# Patient Record
Sex: MALE | Race: WHITE | NOT HISPANIC OR LATINO | Employment: OTHER | URBAN - METROPOLITAN AREA
[De-identification: names, ages, dates, MRNs, and addresses within clinical notes are randomized per-mention and may not be internally consistent; named-entity substitution may affect disease eponyms.]

---

## 2017-01-17 ENCOUNTER — DOCUMENTATION ONLY (OUTPATIENT)
Dept: FAMILY MEDICINE | Facility: CLINIC | Age: 47
End: 2017-01-17

## 2017-01-17 NOTE — PROGRESS NOTES
Pre-Visit Chart Review  For Appointment Scheduled on 1/18/2017.    Health Maintenance Due   Topic Date Due    TETANUS VACCINE  03/27/1990    Influenza Vaccine  08/01/2016

## 2017-01-18 ENCOUNTER — LAB VISIT (OUTPATIENT)
Dept: LAB | Facility: HOSPITAL | Age: 47
End: 2017-01-18
Attending: FAMILY MEDICINE
Payer: MEDICARE

## 2017-01-18 ENCOUNTER — TELEPHONE (OUTPATIENT)
Dept: FAMILY MEDICINE | Facility: CLINIC | Age: 47
End: 2017-01-18

## 2017-01-18 ENCOUNTER — OFFICE VISIT (OUTPATIENT)
Dept: FAMILY MEDICINE | Facility: CLINIC | Age: 47
End: 2017-01-18
Payer: MEDICARE

## 2017-01-18 VITALS
HEART RATE: 93 BPM | HEIGHT: 68 IN | WEIGHT: 139.13 LBS | SYSTOLIC BLOOD PRESSURE: 131 MMHG | RESPIRATION RATE: 18 BRPM | BODY MASS INDEX: 21.09 KG/M2 | DIASTOLIC BLOOD PRESSURE: 77 MMHG | TEMPERATURE: 98 F

## 2017-01-18 DIAGNOSIS — R91.8 PULMONARY NODULES: ICD-10-CM

## 2017-01-18 DIAGNOSIS — Z00.00 PREVENTATIVE HEALTH CARE: ICD-10-CM

## 2017-01-18 DIAGNOSIS — K21.9 GASTROESOPHAGEAL REFLUX DISEASE WITHOUT ESOPHAGITIS: ICD-10-CM

## 2017-01-18 DIAGNOSIS — N52.9 ERECTILE DYSFUNCTION, UNSPECIFIED ERECTILE DYSFUNCTION TYPE: ICD-10-CM

## 2017-01-18 DIAGNOSIS — Z11.4 ENCOUNTER FOR SCREENING FOR HIV: ICD-10-CM

## 2017-01-18 DIAGNOSIS — R79.9 ABNORMAL FINDING OF BLOOD CHEMISTRY: ICD-10-CM

## 2017-01-18 DIAGNOSIS — Z86.73 HISTORY OF CVA (CEREBROVASCULAR ACCIDENT): Primary | ICD-10-CM

## 2017-01-18 DIAGNOSIS — G47.00 INSOMNIA, UNSPECIFIED TYPE: ICD-10-CM

## 2017-01-18 DIAGNOSIS — J44.9 CHRONIC OBSTRUCTIVE PULMONARY DISEASE, UNSPECIFIED COPD TYPE: ICD-10-CM

## 2017-01-18 DIAGNOSIS — E55.9 VITAMIN D DEFICIENCY: ICD-10-CM

## 2017-01-18 DIAGNOSIS — I73.9 PVD (PERIPHERAL VASCULAR DISEASE): ICD-10-CM

## 2017-01-18 LAB — 25(OH)D3+25(OH)D2 SERPL-MCNC: 20 NG/ML

## 2017-01-18 PROCEDURE — G0008 ADMIN INFLUENZA VIRUS VAC: HCPCS | Mod: S$GLB,,, | Performed by: INTERNAL MEDICINE

## 2017-01-18 PROCEDURE — 99499 UNLISTED E&M SERVICE: CPT | Mod: S$GLB,,, | Performed by: FAMILY MEDICINE

## 2017-01-18 PROCEDURE — 83036 HEMOGLOBIN GLYCOSYLATED A1C: CPT

## 2017-01-18 PROCEDURE — 99999 PR PBB SHADOW E&M-EST. PATIENT-LVL III: CPT | Mod: PBBFAC,,, | Performed by: FAMILY MEDICINE

## 2017-01-18 PROCEDURE — 1159F MED LIST DOCD IN RCRD: CPT | Mod: S$GLB,,, | Performed by: FAMILY MEDICINE

## 2017-01-18 PROCEDURE — 99214 OFFICE O/P EST MOD 30 MIN: CPT | Mod: S$GLB,,, | Performed by: FAMILY MEDICINE

## 2017-01-18 PROCEDURE — 86592 SYPHILIS TEST NON-TREP QUAL: CPT

## 2017-01-18 PROCEDURE — 82306 VITAMIN D 25 HYDROXY: CPT

## 2017-01-18 PROCEDURE — 86703 HIV-1/HIV-2 1 RESULT ANTBDY: CPT

## 2017-01-18 PROCEDURE — 36415 COLL VENOUS BLD VENIPUNCTURE: CPT | Mod: PO

## 2017-01-18 PROCEDURE — 90686 IIV4 VACC NO PRSV 0.5 ML IM: CPT | Mod: S$GLB,,, | Performed by: INTERNAL MEDICINE

## 2017-01-18 RX ORDER — TRAZODONE HYDROCHLORIDE 50 MG/1
50 TABLET ORAL NIGHTLY
Qty: 30 TABLET | Refills: 1 | Status: SHIPPED | OUTPATIENT
Start: 2017-01-18 | End: 2017-08-23

## 2017-01-18 RX ORDER — SILDENAFIL 100 MG/1
100 TABLET, FILM COATED ORAL DAILY PRN
Qty: 5 TABLET | Refills: 0 | Status: SHIPPED | OUTPATIENT
Start: 2017-01-18 | End: 2017-02-22 | Stop reason: SDUPTHER

## 2017-01-18 NOTE — TELEPHONE ENCOUNTER
Insurance won't cover Viagra too expensive. Told him Cialis will be a pricey. Suggestion to go to websites to see if coupons available. Voices understanding
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
## 2017-01-18 NOTE — TELEPHONE ENCOUNTER
----- Message from Brea Espinosa sent at 1/18/2017  3:35 PM CST -----  Contact: self  Patient would like a call regarding something personal at [phone]. Thanks!

## 2017-01-18 NOTE — MR AVS SNAPSHOT
[facility]  [address]  [place] [address]  Phone: [phone]  Fax: [phone]                  Geo Pendleton Jr.   2017 1:00 PM   Office Visit    Description:  Male : 1970   Provider:  Blanco Chaney MD   Department:  [facility]           Reason for Visit     Establish Care           Diagnoses this Visit        Comments    History of CVA (cerebrovascular accident)    -  Primary     Gastroesophageal reflux disease without esophagitis         Chronic obstructive pulmonary disease, unspecified COPD type         Pulmonary nodules         Preventative health care         Encounter for screening for HIV         Erectile dysfunction, unspecified erectile dysfunction type         Abnormal finding of blood chemistry         Vitamin D deficiency         Insomnia, unspecified type         PVD (peripheral vascular disease)                To Do List           Future Appointments        Provider Department Dept Phone    2017 2:15 PM [facility] [phone]    2017 12:45 PM [facility] CT1 LIMIT [address] [facility] [phone]    2017 1:30 PM Aida Fitch MD [facility] [phone]    2017 9:40 AM Blanco Chaney MD [facility] [phone]      Goals (5 Years of Data)     None      Follow-Up and Disposition     Return in about 4 weeks (around 2/15/2017).       These Medications        Disp Refills Start End    sildenafil (VIAGRA) 100 MG tablet 5 tablet 0 2017    Take 1 tablet (100 mg total) by mouth daily as needed for Erectile Dysfunction. - Oral    Pharmacy: [id number]  [place]  [address] AT [facility] & [place] [address] Ph #: [phone]       trazodone (DESYREL) 50 MG tablet 30 tablet 1 2017    Take 1 tablet (50 mg total) by mouth every evening. - Oral    Pharmacy: [facility] [id number]  [place] - [address] AT [facility] & [place] [address] Ph #: [phone]         [facility]     [facility] On Call Nurse Care Line - 24/7 Assistance  Registered nurses in the [facility] On Call Center provide clinical advisement, health education, appointment booking, and other advisory services.  Call for this free service at [phone].             Medications           Message regarding Medications     Verify the changes and/or additions to your medication regime listed below are the same as discussed with your clinician today.  If any of these changes or additions are incorrect, please notify your healthcare provider.        START taking these NEW medications        Refills    sildenafil (VIAGRA) 100 MG tablet 0    Sig: Take 1 tablet (100 mg total) by mouth daily as needed for Erectile Dysfunction.    Class: Normal    Route: Oral    trazodone (DESYREL) 50 MG tablet 1    Sig: Take 1 tablet (50 mg total) by mouth every evening.    Class: Normal    Route: Oral      STOP taking these medications     zolpidem (AMBIEN) 10 mg Tab            Verify that the below list of medications is an accurate representation of the medications you are currently taking.  If none reported, the list may be blank. If incorrect, please contact your healthcare provider. Carry this list with you in case of emergency.           Current Medications     atorvastatin (LIPITOR) 10 MG tablet Take 1 tablet (10 mg total) by mouth once daily.    pantoprazole (PROTONIX) 40 MG tablet Take 1 tablet (40 mg total) by mouth once daily.    sildenafil (VIAGRA) 100 MG tablet Take 1 tablet (100 mg total) by mouth daily as needed for Erectile Dysfunction.    trazodone (DESYREL) 50 MG tablet Take 1 tablet (50 mg total) by mouth every evening.           Clinical Reference Information           Vital Signs - Last Recorded  Most recent update: 1/18/2017  1:10 PM by Vlad Hewitt MA    BP Pulse Temp Resp Ht Wt    131/77 (BP Location: Right arm, Patient Position:  "Sitting, BP Method: Automatic) 93 98 °F (36.7 °C) (Oral) 18 5' 7.5" (1.715 m) 63.1 kg (139 lb 1.8 oz)    BMI                21.47 kg/m2          Blood Pressure          Most Recent Value    BP  131/77      Allergies as of 1/18/2017     No Known Allergies      Immunizations Administered on Date of Encounter - 1/18/2017     None      Orders Placed During Today's Visit     Future Labs/Procedures Expected by Expires    CT Chest Without Contrast  1/18/2017 1/18/2018    Hemoglobin A1c  1/18/2017 3/19/2018    HIV-1 and HIV-2 antibodies  1/18/2017 3/19/2018    RPR  1/18/2017 3/19/2018    Vitamin D  1/18/2017 3/19/2018      Smoking Cessation     If you would like to quit smoking:   You may be eligible for free services if you are a [place] resident and started smoking cigarettes before September 1, 1988.  Call the Smoking Cessation Trust (SCT) toll free at (123) 744-4799 or (735) 388-2601.   Call 6-450-QUIT-NOW if you do not meet the above criteria.            "

## 2017-01-19 ENCOUNTER — OFFICE VISIT (OUTPATIENT)
Dept: VASCULAR SURGERY | Facility: CLINIC | Age: 47
End: 2017-01-19
Payer: MEDICARE

## 2017-01-19 ENCOUNTER — HOSPITAL ENCOUNTER (OUTPATIENT)
Dept: RADIOLOGY | Facility: HOSPITAL | Age: 47
Discharge: HOME | End: 2017-01-19
Attending: FAMILY MEDICINE
Payer: MEDICARE

## 2017-01-19 VITALS
WEIGHT: 140 LBS | SYSTOLIC BLOOD PRESSURE: 132 MMHG | DIASTOLIC BLOOD PRESSURE: 81 MMHG | HEIGHT: 67 IN | HEART RATE: 84 BPM | RESPIRATION RATE: 18 BRPM | BODY MASS INDEX: 21.97 KG/M2

## 2017-01-19 DIAGNOSIS — I87.2 VENOUS INSUFFICIENCY OF LEFT LOWER EXTREMITY: Primary | ICD-10-CM

## 2017-01-19 DIAGNOSIS — E55.9 VITAMIN D DEFICIENCY: Primary | ICD-10-CM

## 2017-01-19 DIAGNOSIS — R91.8 PULMONARY NODULES: ICD-10-CM

## 2017-01-19 LAB
ESTIMATED AVG GLUCOSE: 108 MG/DL
HBA1C MFR BLD HPLC: 5.4 %
HIV 1+2 AB+HIV1 P24 AG SERPL QL IA: NEGATIVE
RPR SER QL: NORMAL

## 2017-01-19 PROCEDURE — 99499 UNLISTED E&M SERVICE: CPT | Mod: S$GLB,,, | Performed by: THORACIC SURGERY (CARDIOTHORACIC VASCULAR SURGERY)

## 2017-01-19 PROCEDURE — 99999 PR PBB SHADOW E&M-EST. PATIENT-LVL III: CPT | Mod: PBBFAC,,, | Performed by: THORACIC SURGERY (CARDIOTHORACIC VASCULAR SURGERY)

## 2017-01-19 PROCEDURE — 71250 CT THORAX DX C-: CPT | Mod: 26,,, | Performed by: RADIOLOGY

## 2017-01-19 PROCEDURE — 99205 OFFICE O/P NEW HI 60 MIN: CPT | Mod: S$GLB,,, | Performed by: THORACIC SURGERY (CARDIOTHORACIC VASCULAR SURGERY)

## 2017-01-19 PROCEDURE — 1159F MED LIST DOCD IN RCRD: CPT | Mod: S$GLB,,, | Performed by: THORACIC SURGERY (CARDIOTHORACIC VASCULAR SURGERY)

## 2017-01-19 RX ORDER — ERGOCALCIFEROL 1.25 MG/1
50000 CAPSULE ORAL
Qty: 8 CAPSULE | Refills: 0 | Status: SHIPPED | OUTPATIENT
Start: 2017-01-19 | End: 2017-02-03

## 2017-01-19 NOTE — MR AVS SNAPSHOT
[facility]  [address]  [place] [address]  Phone: [phone]                  Geo Pendleton Jr.   2017 1:30 PM   Office Visit    Description:  Male : 1970   Provider:  Aida Fitch MD   Department:  [facility]           Reason for Visit     Varicose Veins           Diagnoses this Visit        Comments    Venous insufficiency of left lower extremity    -  Primary            To Do List           Future Appointments        Provider Department Dept Phone    2017 9:40 AM Blanco Chaney MD [facility] [phone]      Goals (5 Years of Data)     None      [facility]     [facility] On Call Nurse Care Line -  Assistance  Registered nurses in the [facility] On Call Center provide clinical advisement, health education, appointment booking, and other advisory services.  Call for this free service at [phone].             Medications           Message regarding Medications     Verify the changes and/or additions to your medication regime listed below are the same as discussed with your clinician today.  If any of these changes or additions are incorrect, please notify your healthcare provider.             Verify that the below list of medications is an accurate representation of the medications you are currently taking.  If none reported, the list may be blank. If incorrect, please contact your healthcare provider. Carry this list with you in case of emergency.           Current Medications     atorvastatin (LIPITOR) 10 MG tablet Take 1 tablet (10 mg total) by mouth once daily.    ergocalciferol (ERGOCALCIFEROL) 50,000 unit Cap Take 1 capsule (50,000 Units total) by mouth every 7 days.    pantoprazole (PROTONIX) 40 MG tablet Take 1 tablet (40 mg total) by mouth once daily.    sildenafil (VIAGRA) 100 MG tablet Take 1 tablet (100 mg total) by mouth daily as needed for Erectile Dysfunction.    trazodone (DESYREL) 50 MG tablet Take 1  "tablet (50 mg total) by mouth every evening.           Clinical Reference Information           Vital Signs - Last Recorded  Most recent update: 1/19/2017  1:30 PM by Zoey Aguila    BP Pulse Resp Ht Wt BMI    132/81 84 18 5' 7" (1.702 m) 63.5 kg (140 lb) 21.93 kg/m2      Blood Pressure          Most Recent Value    BP  132/81      Allergies as of 1/19/2017     No Known Allergies      Immunizations Administered on Date of Encounter - 1/19/2017     None      Smoking Cessation     If you would like to quit smoking:   You may be eligible for free services if you are a [place] resident and started smoking cigarettes before September 1, 1988.  Call the Smoking Cessation Trust (SCT) toll free at (736) 073-2380 or (914) 835-7610.   Call 7-595-QUIT-NOW if you do not meet the above criteria.            "

## 2017-01-19 NOTE — LETTER
January 19, 2017      Terrie Lira, [facility]  [address] [facility] [place] [address]           [facility]  [address] [facility] [address] [place] [address]  Phone: [phone]          Patient: Geo Pendleton Jr.   MR Number: [id number]   YOB: 1970   Date of Visit: 1/19/2017       Dear Terrie Lira:    Thank you for referring Geo Pendleton to me for evaluation. Attached you will find relevant portions of my assessment and plan of care.    If you have questions, please do not hesitate to call me. I look forward to following Geo Pendleton along with you.    Sincerely,    Aiad Fitch MD    Enclosure  CC:  No Recipients    If you would like to receive this communication electronically, please contact [email or web address] or [phone] to request more information on [employer] Link access.    For providers and/or their staff who would like to refer a patient to [facility], please contact us through our one-stop-shop provider referral line, Marii Diaz, at [phone].    If you feel you have received this communication in error or would no longer like to receive these types of communications, please e-mail [email or web address]
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
## 2017-01-23 ENCOUNTER — TELEPHONE (OUTPATIENT)
Dept: VASCULAR SURGERY | Facility: CLINIC | Age: 47
End: 2017-01-23

## 2017-01-23 DIAGNOSIS — I87.302 VENOUS HYPERTENSION OF LEFT LOWER EXTREMITY: ICD-10-CM

## 2017-01-23 DIAGNOSIS — I87.2 VENOUS INSUFFICIENCY: Primary | ICD-10-CM

## 2017-01-23 RX ORDER — SODIUM CHLORIDE 9 MG/ML
INJECTION, SOLUTION INTRAVENOUS CONTINUOUS
Status: CANCELLED | OUTPATIENT
Start: 2017-01-23

## 2017-01-23 NOTE — TELEPHONE ENCOUNTER
----- Message from Za Lovell sent at 1/23/2017  3:21 PM CST -----  Contact: patient  Patient calling in regards to finding out if he is spending the night after procedure or is he leaving that same day. Please advise. Call to pod. No answer.  Call back .  Thanks!

## 2017-02-06 PROBLEM — I87.2 VENOUS INSUFFICIENCY: Status: ACTIVE | Noted: 2017-02-06

## 2017-02-08 ENCOUNTER — TELEPHONE (OUTPATIENT)
Dept: VASCULAR SURGERY | Facility: CLINIC | Age: 47
End: 2017-02-08

## 2017-02-08 NOTE — TELEPHONE ENCOUNTER
----- Message from Betsy Kenyon sent at 2/8/2017 11:27 AM CST -----  Contact: self   Patient wants to speak with a nurse regarding scheduling follow up appointment please call back at [phone] (home)

## 2017-02-09 ENCOUNTER — TELEPHONE (OUTPATIENT)
Dept: VASCULAR SURGERY | Facility: CLINIC | Age: 47
End: 2017-02-09

## 2017-02-09 NOTE — TELEPHONE ENCOUNTER
Pt informed of the procedure and that the stent was placed in his Left Iliac Vein. Verbalized understanding.

## 2017-02-09 NOTE — TELEPHONE ENCOUNTER
----- Message from Kacey Chandler sent at 2/9/2017 11:19 AM CST -----  Contact: pt [phone]  Patient called and asked for a call back he has a questions about his procedure from Monday.

## 2017-02-10 ENCOUNTER — TELEPHONE (OUTPATIENT)
Dept: VASCULAR SURGERY | Facility: CLINIC | Age: 47
End: 2017-02-10

## 2017-02-10 NOTE — TELEPHONE ENCOUNTER
----- Message from Juan Fried sent at 2/10/2017 10:34 AM CST -----  Contact: pt  Pt is requesting a callback  Call Back#[phone]  Thanks

## 2017-02-21 ENCOUNTER — DOCUMENTATION ONLY (OUTPATIENT)
Dept: FAMILY MEDICINE | Facility: CLINIC | Age: 47
End: 2017-02-21

## 2017-02-21 NOTE — PROGRESS NOTES
Pre-Visit Chart Review  For Appointment Scheduled on 2/22/2017.    Health Maintenance Due   Topic Date Due    TETANUS VACCINE  03/27/1990    Pneumococcal PPSV23 (Medium Risk) (1) 03/27/1990

## 2017-02-22 ENCOUNTER — OFFICE VISIT (OUTPATIENT)
Dept: FAMILY MEDICINE | Facility: CLINIC | Age: 47
End: 2017-02-22
Payer: MEDICARE

## 2017-02-22 VITALS
WEIGHT: 140.88 LBS | BODY MASS INDEX: 22.11 KG/M2 | DIASTOLIC BLOOD PRESSURE: 78 MMHG | TEMPERATURE: 98 F | HEART RATE: 90 BPM | HEIGHT: 67 IN | SYSTOLIC BLOOD PRESSURE: 118 MMHG

## 2017-02-22 DIAGNOSIS — E55.9 VITAMIN D DEFICIENCY: ICD-10-CM

## 2017-02-22 DIAGNOSIS — N52.9 ERECTILE DYSFUNCTION, UNSPECIFIED ERECTILE DYSFUNCTION TYPE: ICD-10-CM

## 2017-02-22 PROCEDURE — 1160F RVW MEDS BY RX/DR IN RCRD: CPT | Mod: S$GLB,,, | Performed by: FAMILY MEDICINE

## 2017-02-22 PROCEDURE — 99213 OFFICE O/P EST LOW 20 MIN: CPT | Mod: S$GLB,,, | Performed by: FAMILY MEDICINE

## 2017-02-22 PROCEDURE — 99999 PR PBB SHADOW E&M-EST. PATIENT-LVL III: CPT | Mod: PBBFAC,,, | Performed by: FAMILY MEDICINE

## 2017-02-22 RX ORDER — SILDENAFIL 100 MG/1
100 TABLET, FILM COATED ORAL DAILY PRN
Qty: 5 TABLET | Refills: 0 | Status: SHIPPED | OUTPATIENT
Start: 2017-02-22 | End: 2018-02-22

## 2017-02-22 NOTE — MR AVS SNAPSHOT
[facility]  [address] [place] [address]  Phone: [phone]  Fax: [phone]                  Geo Pendleton Jr.   2017 9:40 AM   Office Visit    Description:  Male : 1970   Provider:  Blanco Chaney MD   Department:  [place] [facility]           Reason for Visit     Follow-up           Diagnoses this Visit        Comments    Vitamin D deficiency         Erectile dysfunction, unspecified erectile dysfunction type                To Do List           Future Appointments        Provider Department Dept Phone    2017 2:30 PM Aida Fitch MD [facility] [phone]    2017 1:00 PM Blanco Chaney MD [facility] [phone]      Goals (5 Years of Data)     None      Follow-Up and Disposition     Return in about 6 months (around 2017).       These Medications        Disp Refills Start End    sildenafil (VIAGRA) 100 MG tablet 5 tablet 0 2017    Take 1 tablet (100 mg total) by mouth daily as needed for Erectile Dysfunction. - Oral    Pharmacy: [id number]  [place], [place] - [address]  AT [place] [address] Ph #: [phone]         [facility] On Call     [facility] On Call Nurse Care Line - 24/7 Assistance  Registered nurses in the [facility] On Call Center provide clinical advisement, health education, appointment booking, and other advisory services.  Call for this free service at [phone].             Medications           Message regarding Medications     Verify the changes and/or additions to your medication regime listed below are the same as discussed with your clinician today.  If any of these changes or additions are incorrect, please notify your healthcare provider.             Verify that the below list of medications is an accurate representation of the medications you are currently taking.  If none reported, the list may be blank. If incorrect, please contact your  "healthcare provider. Carry this list with you in case of emergency.           Current Medications     atorvastatin (LIPITOR) 10 MG tablet Take 1 tablet (10 mg total) by mouth once daily.    clopidogrel (PLAVIX) 75 mg tablet Take 1 tablet (75 mg total) by mouth once daily.    pantoprazole (PROTONIX) 40 MG tablet Take 1 tablet (40 mg total) by mouth once daily.    sildenafil (VIAGRA) 100 MG tablet Take 1 tablet (100 mg total) by mouth daily as needed for Erectile Dysfunction.    trazodone (DESYREL) 50 MG tablet Take 1 tablet (50 mg total) by mouth every evening.    aspirin (ECOTRIN) 81 MG EC tablet Take 1 tablet (81 mg total) by mouth once.    hydrocodone-acetaminophen 5-325mg (NORCO) 5-325 mg per tablet Take 1 tablet by mouth every 6 (six) hours as needed for Pain.           Clinical Reference Information           Your Vitals Were     BP Pulse Temp Height Weight BMI    118/78 (BP Location: Right arm, Patient Position: Sitting, BP Method: Automatic) 90 98.1 °F (36.7 °C) (Oral) 5' 7" (1.702 m) 63.9 kg (140 lb 14 oz) 22.06 kg/m2      Blood Pressure          Most Recent Value    BP  118/78      Allergies as of 2/22/2017     No Known Allergies      Immunizations Administered on Date of Encounter - 2/22/2017     None      Smoking Cessation     If you would like to quit smoking:   You may be eligible for free services if you are a [place] resident and started smoking cigarettes before September 1, 1988.  Call the Smoking Cessation Trust ([facility]) toll free at (065) 158-5635 or (392) 337-9325.   Call 1-800-QUIT-NOW if you do not meet the above criteria.            Language Assistance Services     ATTENTION: Language assistance services are available, free of charge. Please call [phone].      ATENCIÓN: Si habla español, tiene a goncalves disposición servicios gratuitos de asistencia lingüística. Llame al [phone].     CHÚ Ý: N?u b?n nói Ti?ng Vi?t, có các d?ch v? h? tr? ngôn ng? mi?n phí dành cho b?n. G?i s? " [phone].         [facility] complies with applicable Federal civil rights laws and does not discriminate on the basis of race, color, national origin, age, disability, or sex.

## 2017-02-22 NOTE — PROGRESS NOTES
"[facility]  Progress Note    Subjective:       Patient ID: Geo Pendleton Jr. is a 46 y.o. male.    Chief Complaint: ED follow up   HPI46 y.o.male is here today for erectile dysfunction follow-up.  Patient was unable to fill Viagra secondary to cost.  Patient was also found to have vitamin D deficiency on lab work.  Patient is currently not on vitamin D supplementation.  No further complaints at today's visit.  Review of Systems   Constitutional: Negative for chills and fever.   HENT: Negative for congestion.    Eyes: Negative for pain.   Respiratory: Negative for shortness of breath and wheezing.    Cardiovascular: Negative for chest pain, palpitations and leg swelling.   Gastrointestinal: Negative for abdominal pain, nausea and vomiting.   Genitourinary: Negative for difficulty urinating.        ED   Musculoskeletal: Negative for arthralgias, gait problem and myalgias.   Skin: Negative for rash.   Neurological: Negative for seizures.       Objective:      Vitals:    02/22/17 0921   BP: 118/78   BP Location: Right arm   Patient Position: Sitting   BP Method: Automatic   Pulse: 90   Temp: 98.1 °F (36.7 °C)   TempSrc: Oral   Weight: 63.9 kg (140 lb 14 oz)   Height: 5' 7" (1.702 m)     Body mass index is 22.06 kg/(m^2).  Physical Exam   Constitutional: He is oriented to person, place, and time. He appears well-developed and well-nourished. No distress.   HENT:   Head: Normocephalic and atraumatic.   Cardiovascular: Normal rate, regular rhythm, normal heart sounds and intact distal pulses.  Exam reveals no gallop and no friction rub.    No murmur heard.  Pulmonary/Chest: Effort normal and breath sounds normal. No respiratory distress. He has no rales.   Abdominal: Soft. He exhibits no distension and no mass. There is no rebound and no guarding. No hernia.   Musculoskeletal: Normal range of motion.   Neurological: He is alert and oriented to person, place, and time.   Skin: Skin is warm.   Psychiatric: He has a " normal mood and affect. His behavior is normal. Judgment and thought content normal.   Vitals reviewed.      Assessment:       1. Vitamin D deficiency    2. Erectile dysfunction, unspecified erectile dysfunction type        Plan:       Vitamin D deficiency        - Daily Vit D OTC 5797-6650 units daily     Erectile dysfunction, unspecified erectile dysfunction type  -     sildenafil (VIAGRA) 100 MG tablet; Take 1 tablet (100 mg total) by mouth daily as needed for Erectile Dysfunction.  Dispense: 5 tablet; Refill: 0      Return in about 6 months (around 8/22/2017).  Blanco Chaney MD  [facility]  2/22/2017 11:09 AM

## 2017-02-23 ENCOUNTER — OFFICE VISIT (OUTPATIENT)
Dept: VASCULAR SURGERY | Facility: CLINIC | Age: 47
End: 2017-02-23
Payer: MEDICARE

## 2017-02-23 VITALS
WEIGHT: 138 LBS | SYSTOLIC BLOOD PRESSURE: 125 MMHG | HEART RATE: 90 BPM | DIASTOLIC BLOOD PRESSURE: 77 MMHG | RESPIRATION RATE: 16 BRPM | HEIGHT: 67 IN | BODY MASS INDEX: 21.66 KG/M2

## 2017-02-23 DIAGNOSIS — I87.2 VENOUS INSUFFICIENCY OF LEFT LOWER EXTREMITY: Primary | ICD-10-CM

## 2017-02-23 DIAGNOSIS — I87.1 MAY-THURNER SYNDROME: ICD-10-CM

## 2017-02-23 PROCEDURE — 1160F RVW MEDS BY RX/DR IN RCRD: CPT | Mod: S$GLB,,, | Performed by: THORACIC SURGERY (CARDIOTHORACIC VASCULAR SURGERY)

## 2017-02-23 PROCEDURE — 99213 OFFICE O/P EST LOW 20 MIN: CPT | Mod: S$GLB,,, | Performed by: THORACIC SURGERY (CARDIOTHORACIC VASCULAR SURGERY)

## 2017-02-23 PROCEDURE — 99999 PR PBB SHADOW E&M-EST. PATIENT-LVL III: CPT | Mod: PBBFAC,,, | Performed by: THORACIC SURGERY (CARDIOTHORACIC VASCULAR SURGERY)

## 2017-02-23 NOTE — PROGRESS NOTES
Mr. Pendleton underwent stenting of his left common iliac vein a couple of weeks   ago for May-Thurner syndrome.  He also has venous insufficiency of the left   greater saphenous vein with some very large varicose veins.  He is still   experiencing pain of the left lower extremity.    On physical examination, the access site in the groin has healed.  There is no   hematoma and no ecchymosis.  He has a large protruding varicose veins with some   hyperpigmentation on the leg.    We will next proceed with endovenous laser treatment of the left greater   saphenous vein and we will probably need phlebectomy and/or Veinlite-guided   sclerotherapy.      PORSCHE  dd: 02/23/2017 17:40:17 (CST)  td: 02/24/2017 12:40:33 (CST)  Doc ID   #[id number]  Job ID #[id number]    CC:

## 2017-02-23 NOTE — MR AVS SNAPSHOT
[facility] [facility]  [address] [facility] [address]  [place] [address]  Phone: [phone]                  Geo Pendleton Jr.   2017 2:30 PM   Office Visit    Description:  Male : 1970   Provider:  Aida Fitch MD   Department:  [place] [facility]           Reason for Visit     Follow-up           Diagnoses this Visit        Comments    Venous insufficiency of left lower extremity    -  Primary     May-Thurner syndrome                To Do List           Future Appointments        Provider Department Dept Phone    2017 1:00 PM Blanco Chaney MD [facility] [phone]      Goals (5 Years of Data)     None      [facility]     [facility] On [facility] Nurse Care Line -  Assistance  Registered nurses in the [facility] On Call Center provide clinical advisement, health education, appointment booking, and other advisory services.  Call for this free service at [phone].             Medications           Message regarding Medications     Verify the changes and/or additions to your medication regime listed below are the same as discussed with your clinician today.  If any of these changes or additions are incorrect, please notify your healthcare provider.             Verify that the below list of medications is an accurate representation of the medications you are currently taking.  If none reported, the list may be blank. If incorrect, please contact your healthcare provider. Carry this list with you in case of emergency.           Current Medications     atorvastatin (LIPITOR) 10 MG tablet Take 1 tablet (10 mg total) by mouth once daily.    clopidogrel (PLAVIX) 75 mg tablet Take 1 tablet (75 mg total) by mouth once daily.    pantoprazole (PROTONIX) 40 MG tablet Take 1 tablet (40 mg total) by mouth once daily.    sildenafil (VIAGRA) 100 MG tablet Take 1 tablet (100 mg total) by mouth daily as needed for Erectile Dysfunction.    aspirin (ECOTRIN) 81 MG EC tablet Take 1  "tablet (81 mg total) by mouth once.    hydrocodone-acetaminophen 5-325mg (NORCO) 5-325 mg per tablet Take 1 tablet by mouth every 6 (six) hours as needed for Pain.    trazodone (DESYREL) 50 MG tablet Take 1 tablet (50 mg total) by mouth every evening.           Clinical Reference Information           Your Vitals Were     BP Pulse Resp Height Weight BMI    125/77 90 16 5' 7" (1.702 m) 62.6 kg (138 lb) 21.61 kg/m2      Blood Pressure          Most Recent Value    BP  125/77      Allergies as of 2/23/2017     No Known Allergies      Immunizations Administered on Date of Encounter - 2/23/2017     None      Smoking Cessation     If you would like to quit smoking:   You may be eligible for free services if you are a [place] resident and started smoking cigarettes before September 1, 1988.  Call the Smoking Cessation Trust ([employer]) toll free at (617) 566-4137 or (787) 695-1854.   Call 4-100-QUIT-NOW if you do not meet the above criteria.            Language Assistance Services     ATTENTION: Language assistance services are available, free of charge. Please call [phone].      ATENCIÓN: Si habla español, tiene a goncalves disposición servicios gratuitos de asistencia lingüística. Llame al [phone].     BILL Ý: N?u b?n nói Ti?ng Vi?t, có các d?ch v? h? tr? ngôn ng? mi?n phí dành cho b?n. G?i s? [phone].         [facility] complies with applicable Federal civil rights laws and does not discriminate on the basis of race, color, national origin, age, disability, or sex.        "

## 2017-03-06 ENCOUNTER — TELEPHONE (OUTPATIENT)
Dept: FAMILY MEDICINE | Facility: CLINIC | Age: 47
End: 2017-03-06

## 2017-03-06 ENCOUNTER — DOCUMENTATION ONLY (OUTPATIENT)
Dept: FAMILY MEDICINE | Facility: CLINIC | Age: 47
End: 2017-03-06

## 2017-03-06 NOTE — PROGRESS NOTES
Pre-Visit Chart Review  For Appointment Scheduled on 3/7/17.    Health Maintenance Due   Topic Date Due    TETANUS VACCINE  03/27/1990    Pneumococcal PPSV23 (Medium Risk) (1) 03/27/1990

## 2017-03-06 NOTE — TELEPHONE ENCOUNTER
----- Message from Betsy Kenyon sent at 3/6/2017 10:03 AM CST -----  Contact: self   Patient wants to know if you can call some ambien in for him if so please send to     [facility] [id number] - [place] - [address] AT [facility] [address]  [address] [place] [address]  Phone: [phone] Fax: [phone]

## 2017-03-07 ENCOUNTER — OFFICE VISIT (OUTPATIENT)
Dept: FAMILY MEDICINE | Facility: CLINIC | Age: 47
End: 2017-03-07
Payer: MEDICARE

## 2017-03-07 VITALS
BODY MASS INDEX: 21.9 KG/M2 | HEIGHT: 67 IN | HEART RATE: 90 BPM | WEIGHT: 139.56 LBS | SYSTOLIC BLOOD PRESSURE: 133 MMHG | DIASTOLIC BLOOD PRESSURE: 83 MMHG | TEMPERATURE: 98 F

## 2017-03-07 DIAGNOSIS — G47.00 INSOMNIA, UNSPECIFIED TYPE: Primary | ICD-10-CM

## 2017-03-07 PROCEDURE — 99999 PR PBB SHADOW E&M-EST. PATIENT-LVL III: CPT | Mod: PBBFAC,,, | Performed by: FAMILY MEDICINE

## 2017-03-07 PROCEDURE — 99213 OFFICE O/P EST LOW 20 MIN: CPT | Mod: S$GLB,,, | Performed by: FAMILY MEDICINE

## 2017-03-07 PROCEDURE — 1160F RVW MEDS BY RX/DR IN RCRD: CPT | Mod: S$GLB,,, | Performed by: FAMILY MEDICINE

## 2017-03-07 RX ORDER — ZOLPIDEM TARTRATE 5 MG/1
5 TABLET ORAL NIGHTLY PRN
Qty: 30 TABLET | Refills: 0 | Status: SHIPPED | OUTPATIENT
Start: 2017-03-07 | End: 2017-08-23

## 2017-03-07 NOTE — PROGRESS NOTES
"[facility]  Progress Note    Subjective:       Patient ID: Geo Pendleton Jr. is a 46 y.o. male.    Chief Complaint: I cant sleep    HPI46 y.o.male is here today for insomnia.  Patient has a chronic history of having trouble sleeping after he had experienced CVA.  Patient was taking Ambien 10 mg per night.  Patient was started on trazodone and attempt to wean patient off Ambien.  Patient has been taking trazodone every night.  Patient has not been able to properly fall or staying asleep.  Patient is requesting refill on Ambien.  No further complaints at today's visit.  Review of Systems   Constitutional: Negative for chills, fatigue and fever.   HENT: Negative for congestion, ear pain, postnasal drip, sinus pressure, sneezing and sore throat.    Eyes: Negative for pain.   Respiratory: Negative for cough, shortness of breath and wheezing.    Cardiovascular: Negative for chest pain, palpitations and leg swelling.   Gastrointestinal: Negative for abdominal distention, abdominal pain, constipation, diarrhea, nausea and vomiting.   Genitourinary: Negative for difficulty urinating.   Musculoskeletal: Negative for back pain and myalgias.   Skin: Negative for rash.   Neurological: Negative for dizziness, seizures, syncope, weakness and numbness.   Psychiatric/Behavioral: Positive for sleep disturbance. The patient is not nervous/anxious.        Objective:      Vitals:    03/07/17 1103   BP: 133/83   BP Location: Right arm   Patient Position: Sitting   BP Method: Automatic   Pulse: 90   Temp: 97.7 °F (36.5 °C)   TempSrc: Oral   Weight: 63.3 kg (139 lb 8.8 oz)   Height: 5' 7" (1.702 m)     Body mass index is 21.86 kg/(m^2).  Physical Exam   Constitutional: He is oriented to person, place, and time. He appears well-developed and well-nourished. No distress.   HENT:   Head: Normocephalic and atraumatic.   Cardiovascular: Normal rate, regular rhythm, normal heart sounds and intact distal pulses.  Exam reveals no gallop " and no friction rub.    No murmur heard.  Pulmonary/Chest: Effort normal and breath sounds normal. No respiratory distress. He has no rales.   Abdominal: Soft. He exhibits no distension and no mass. There is no rebound and no guarding. No hernia.   Musculoskeletal: Normal range of motion.   Neurological: He is alert and oriented to person, place, and time.   Skin: Skin is warm.   Psychiatric: He has a normal mood and affect. His behavior is normal. Judgment and thought content normal.   Vitals reviewed.      Assessment:       1. Insomnia, unspecified type        Plan:       Insomnia, unspecified type  -     Ambulatory referral to Psychiatry  -     zolpidem (AMBIEN) 5 MG Tab; Take 1 tablet (5 mg total) by mouth nightly as needed.  Dispense: 30 tablet; Refill: 0  - Patient understands that he will have to have a psychiatric evaluation for further refills on Ambien      Return in about 6 months (around 9/7/2017).  Blanco Chaney MD  [facility]  3/7/2017 11:20 AM

## 2017-03-16 ENCOUNTER — TELEPHONE (OUTPATIENT)
Dept: FAMILY MEDICINE | Facility: CLINIC | Age: 47
End: 2017-03-16

## 2017-03-16 NOTE — TELEPHONE ENCOUNTER
Patient calls for disability paperwork completion by 3/20/17. On disability 15years, but says has not discussed paperwork with . Does he need appointment for discussion and completion? No appointments before 3/20/17. Please advise

## 2017-03-16 NOTE — TELEPHONE ENCOUNTER
----- Message from Christina Hillman sent at 3/15/2017  1:25 PM CDT -----  [phone] .. Asking to discuss paperwork for disability .. Will need back before 03/20

## 2017-03-16 NOTE — TELEPHONE ENCOUNTER
Please call informed patient unfortunately at the current time I do not participate in disability evaluations.

## 2017-03-23 ENCOUNTER — TELEPHONE (OUTPATIENT)
Dept: VASCULAR SURGERY | Facility: CLINIC | Age: 47
End: 2017-03-23

## 2017-03-23 NOTE — TELEPHONE ENCOUNTER
----- Message from Nena Loera sent at 3/23/2017  1:14 PM CDT -----  Contact: self  Patient [phone] is calling to speak with Nurse about what medicine he should stop taking before his procedure - per patient - this Monday 03 27 17/please call patient

## 2017-03-23 NOTE — TELEPHONE ENCOUNTER
----- Message from Nena Calabrese sent at 3/23/2017  1:21 PM CDT -----  Contact: Geo Shafer is calling with question. Please call [phone]. Thanks!

## 2017-03-23 NOTE — TELEPHONE ENCOUNTER
Pt very confused regarding pre-procedure medications.  Reviewed medications for pt to hold prior to procedure several times. Seemed to understand. Pt was able to repeat back instructions.

## 2017-03-29 ENCOUNTER — PROCEDURE VISIT (OUTPATIENT)
Dept: VASCULAR SURGERY | Facility: CLINIC | Age: 47
End: 2017-03-29
Payer: MEDICARE

## 2017-03-29 DIAGNOSIS — I87.2 VENOUS INSUFFICIENCY: Primary | ICD-10-CM

## 2017-03-29 DIAGNOSIS — I87.2 VENOUS INSUFFICIENCY OF LEFT LOWER EXTREMITY: Primary | ICD-10-CM

## 2017-03-29 PROCEDURE — 99499 UNLISTED E&M SERVICE: CPT | Mod: S$GLB,,, | Performed by: THORACIC SURGERY (CARDIOTHORACIC VASCULAR SURGERY)

## 2017-03-29 PROCEDURE — 99152 MOD SED SAME PHYS/QHP 5/>YRS: CPT | Mod: S$GLB,,, | Performed by: THORACIC SURGERY (CARDIOTHORACIC VASCULAR SURGERY)

## 2017-03-29 PROCEDURE — 36478 ENDOVENOUS LASER 1ST VEIN: CPT | Mod: LT,S$GLB,, | Performed by: THORACIC SURGERY (CARDIOTHORACIC VASCULAR SURGERY)

## 2017-03-29 RX ORDER — MIDAZOLAM HYDROCHLORIDE 1 MG/ML
4 INJECTION, SOLUTION INTRAMUSCULAR; INTRAVENOUS
Status: DISCONTINUED | OUTPATIENT
Start: 2017-03-29 | End: 2017-08-23 | Stop reason: ALTCHOICE

## 2017-03-29 RX ORDER — DIAZEPAM 10 MG/1
10 TABLET ORAL
Status: DISCONTINUED | OUTPATIENT
Start: 2017-03-29 | End: 2017-08-23 | Stop reason: ALTCHOICE

## 2017-03-29 NOTE — PROGRESS NOTES
"Patient Name: Geo Pendleton Jr.     Date: 03/29/2017    Patient Verification: Two stated identifiers    Laser Safety Checklist: Done    Procedure: EVLT  left  evlt   NPO since:  6 hours    Height: 5'7"   Weight: 154 lbs      Pre-op Vitals:   BP: 138 / 92  HR: 90 bpm   RR: 18    Allergies:  Reviewed  Medications: Reviewed   Consent reviewed & signed.      IV Start Time 1320  22 gauge   Site: LT FA  Fluid: NS 0.9%  Infusing at 150 cc/hr    Aldretti Score:  Activity: 2 = Moves 4 extremities  Circulation: 2 = Stable BP and pulse  Respirations: 2 = Normal respirations  Color: 2 = Pink  Consciousness: 2 = Awake and alert  Pain: 0     ASA:  2 = Mild systemic disease    Time Out:     Correct patient: Yes - GW      Correct procedure: Yes - GW  Correct site (marked): Yes - GW    Correct Position: Yes - GW supine  Correct Laterality: Yes - GW Left  Time completed: 1325    Procedure Start Time: 1330      Level of Sedation / LOC:  1 = Normal response to verbal stimuli  2 = Purposeful response to verbal and/or tactile stimulation  3 = Purposeful response following repeated or painful stimulus  4 = Unarousable, even with painful stimuli    Time 1330 1335 1340 1345 1350 1355           /79 118/81 146/62 132/82 154/74 124/57           Pulse 77 78 78 89 80 82           Resp 16 16 16 16 14 16           O2sat 96 96 95 95 91 91           EKG SR SR SR SR SR SR           Pain 0 6 2 9 10 0           LOC 1 1 1 1 1 2             Time Medication RN   1310 Valium 10 mg PO GW   1327 Demerol 25 mg IV GW   1327 Versed 2 mg IV GW   1332 Demerol 25 mg IV GW   1332 Versed 2 mg IV GW   1345 Demerol 12.5 mg IV GW   1345 Aeuful3ir IV GW   1350 Demerol 12.5mg IV GW          Procedure End Time: 1358  EBL: <5CC      Rx Given: Yes - NORCO  Post-procedural Education Given: Yes  LOC at Discharge: 1  Discharge Time: 1440  Discharge to Home  Discharge with patient and son  Discharge Vitals: 114/61,16, 79  D/C'D PIV, SITE BENIGN, CATHETER TIP INTACT.  "     MD: Dominic Fitch MD  RN: Violeta Todd  Scrub: Zoey Aguila
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
## 2017-03-29 NOTE — MR AVS SNAPSHOT
[facility]  [address]  [place] [address]  Phone: [phone]                  Geo Pendleton Jr.   3/29/2017 12:30 PM   Procedure visit    Description:  Male : 1970   Provider:  Aida Fitch MD   Department:  [facility]           Diagnoses this Visit        Comments    Venous insufficiency of left lower extremity    -  Primary            To Do List           Future Appointments        Provider Department Dept Phone    2017 2:30 PM Aida Fitch MD [facility] [phone]    2017 1:00 PM Blanco Chaney MD [facility] [phone]      Goals (5 Years of Data)     None      [facility]     [facility] Nurse Care Line -  Assistance  Unless otherwise directed by your provider, please contact [facility], our nurse care line that is available for  assistance.     Registered nurses in the [facility] provide: appointment scheduling, clinical advisement, health education, and other advisory services.  Call: [phone] (toll free)               Medications           Message regarding Medications     Verify the changes and/or additions to your medication regime listed below are the same as discussed with your clinician today.  If any of these changes or additions are incorrect, please notify your healthcare provider.             Verify that the below list of medications is an accurate representation of the medications you are currently taking.  If none reported, the list may be blank. If incorrect, please contact your healthcare provider. Carry this list with you in case of emergency.           Current Medications     aspirin (ECOTRIN) 81 MG EC tablet Take 1 tablet (81 mg total) by mouth once.    atorvastatin (LIPITOR) 10 MG tablet Take 1 tablet (10 mg total) by mouth once daily.    clopidogrel (PLAVIX) 75 mg tablet Take 1 tablet (75 mg total) by mouth once daily.    hydrocodone-acetaminophen 5-325mg  (NORCO) 5-325 mg per tablet Take 1 tablet by mouth every 6 (six) hours as needed for Pain.    pantoprazole (PROTONIX) 40 MG tablet Take 1 tablet (40 mg total) by mouth once daily.    sildenafil (VIAGRA) 100 MG tablet Take 1 tablet (100 mg total) by mouth daily as needed for Erectile Dysfunction.    trazodone (DESYREL) 50 MG tablet Take 1 tablet (50 mg total) by mouth every evening.    zolpidem (AMBIEN) 5 MG Tab Take 1 tablet (5 mg total) by mouth nightly as needed.           Clinical Reference Information           Allergies as of 3/29/2017     No Known Allergies      Immunizations Administered on Date of Encounter - 3/29/2017     None      Smoking Cessation     If you would like to quit smoking:   You may be eligible for free services if you are a [place] resident and started smoking cigarettes before September 1, 1988.  Call the Smoking Cessation Trust ([facility]) toll free at [phone] [phone] or ([phone] 832-5066.   Call 1-800-QUIT-NOW if you do not meet the above criteria.   Contact us via email: [email or web address]   View our website for more information: [email or web address]        Language Assistance Services     ATTENTION: Language assistance services are available, free of charge. Please call [phone].      ATENCIÓN: Si johnla kiran, tiene a goncalves disposición servicios gratuitos de asistencia lingüística. Llame al [phone].     [facility] Ý: N?u b?n nói Ti?ng Vi?t, có các d?ch v? h? tr? ngôn ng? mi?n phí dành cho b?n. G?i s? [phone].         [facility] complies with applicable Federal civil rights laws and does not discriminate on the basis of race, color, national origin, age, disability, or sex.

## 2017-03-30 NOTE — PROCEDURES
Procedures OPERATIVE REPORT    DATE OF PROCEDURE:  03/29/2017    PREOPERATIVE DIAGNOSES:  1.  Venous insufficiency of the left greater saphenous vein.  2.  Varicose veins with pain and edema of the left lower extremity.  3.  May-Thurner syndrome.  4.  Status post stenting of the left common iliac vein.    POSTOPERATIVE DIAGNOSES:  1.  Venous insufficiency of the left greater saphenous vein.  2.  Varicose veins with pain and edema of the left lower extremity.  3.  May-Thurner syndrome.  4.  Status post stenting of the left common iliac vein.    OPERATION:  Endovenous laser ablation of the left greater saphenous vein.    SURGEON:  Dominic Fitch MD, FACS    ASSISTANT:  Zoey Aguila CST, CFA    ANESTHESIA:  1.  Local tumescent anesthesia using a mixture of 50 mL of 1% lidocaine with   epinephrine and 500 mL of normal saline.  2.  P.o. conscious sedation using 10 mg of Valium sublingual.  3.  Intravenous sedation using 50 mg of Demerol and 6 mg of Versed IV.    The patient's level of consciousness was monitored by the registered nurse from   1310 to 1440.  Other monitors included blood pressure, pulse oximetry, heart   rate and respiratory rate.    PROCEDURE IN DETAIL:  With the patient in the supine position on the procedure   table in the procedure room in the clinic, the left lower extremity was prepped   and draped in a sterile fashion.  Access to the left greater saphenous vein was   obtained below the knee using an 18-gauge access needle and ultrasound guidance   and the guidewire was passed through this.  A 65 cm long sheath was passed over   the guidewire and the guidewire and dilator were removed and the sheath was   aspirated and flushed.  The laser fiber was then passed through the sheath, and   the tip of the fiber was positioned distal to the saphenofemoral junction and   the superior epigastric vein.  Local tumescent anesthesia was infiltrated into   the perisaphenous tissues and then the Dornier 940 nm  laser was activated in the   pulse mode with the duration of each pulse set at 1.3 seconds per pulse and the   laser was pulled back at approximately 1 mm per second until the entire length   of cannulated vein was treated and ablated.  Compression dressing was applied to   the lower extremity.  The patient tolerated the procedure and left the   procedure room in satisfactory and stable condition.      PORSCHE  dd: 03/30/2017 18:29:18 (CDT)  td: 03/31/2017 18:11:34 (CDT)  Doc ID   #[id number]  Job ID #[id number]    CC:

## 2017-03-31 ENCOUNTER — TELEPHONE (OUTPATIENT)
Dept: VASCULAR SURGERY | Facility: CLINIC | Age: 47
End: 2017-03-31

## 2017-03-31 ENCOUNTER — TELEPHONE (OUTPATIENT)
Dept: FAMILY MEDICINE | Facility: CLINIC | Age: 47
End: 2017-03-31

## 2017-03-31 NOTE — TELEPHONE ENCOUNTER
----- Message from Huong Betancourt sent at 3/31/2017 12:27 PM CDT -----  Contact: self  Needs to discuss disability paperwork. Please call back at .

## 2017-03-31 NOTE — TELEPHONE ENCOUNTER
----- Message from Gatito Lewis sent at 3/31/2017  9:07 AM CDT -----  Contact: Self-   [phone]  Patient had surgery on  Wednesday, asking when can he remove the bandages.. Thanks!

## 2017-03-31 NOTE — TELEPHONE ENCOUNTER
Talked with patient that per conversation of 3/16/17,  does not do disability evaluations; voices understanding

## 2017-03-31 NOTE — TELEPHONE ENCOUNTER
Advised that wrap needed to be on for 48 hours after procedure.  Explained that he could remove the wrap tonight and take a shower but no tub bath.  He is to put compression stockings on Saturday morning.  Verbalized understanding.

## 2017-04-05 ENCOUNTER — TELEPHONE (OUTPATIENT)
Dept: VASCULAR SURGERY | Facility: CLINIC | Age: 47
End: 2017-04-05

## 2017-04-05 NOTE — TELEPHONE ENCOUNTER
Pt c/o intermittent numbness and burning in leg post EVLT.  Reassured pt that these symptoms can be expected post EVLT procedure.  Encouraged pt to continue wearing compression  Stockings and to ambulate frequently and alternate pain medication with ibuprofen.  Pt verbalized understanding.

## 2017-04-05 NOTE — TELEPHONE ENCOUNTER
----- Message from RT Festus sent at 4/4/2017  4:53 PM CDT -----  Contact: pt    Called pod, pt , requesting a call back from Violeta to confirm if he may resume taking his blood thinners again and is having issues with his left leg going numb

## 2017-04-12 ENCOUNTER — OFFICE VISIT (OUTPATIENT)
Dept: VASCULAR SURGERY | Facility: CLINIC | Age: 47
End: 2017-04-12
Payer: MEDICARE

## 2017-04-12 ENCOUNTER — NURSE TRIAGE (OUTPATIENT)
Dept: ADMINISTRATIVE | Facility: CLINIC | Age: 47
End: 2017-04-12

## 2017-04-12 VITALS
RESPIRATION RATE: 16 BRPM | WEIGHT: 139 LBS | BODY MASS INDEX: 21.82 KG/M2 | SYSTOLIC BLOOD PRESSURE: 124 MMHG | HEIGHT: 67 IN | HEART RATE: 90 BPM | DIASTOLIC BLOOD PRESSURE: 82 MMHG

## 2017-04-12 DIAGNOSIS — I87.2 VENOUS INSUFFICIENCY OF LEFT LOWER EXTREMITY: Primary | ICD-10-CM

## 2017-04-12 PROCEDURE — 1160F RVW MEDS BY RX/DR IN RCRD: CPT | Mod: S$GLB,,, | Performed by: THORACIC SURGERY (CARDIOTHORACIC VASCULAR SURGERY)

## 2017-04-12 PROCEDURE — 99214 OFFICE O/P EST MOD 30 MIN: CPT | Mod: 25,S$GLB,, | Performed by: THORACIC SURGERY (CARDIOTHORACIC VASCULAR SURGERY)

## 2017-04-12 PROCEDURE — 99999 PR PBB SHADOW E&M-EST. PATIENT-LVL III: CPT | Mod: PBBFAC,,, | Performed by: THORACIC SURGERY (CARDIOTHORACIC VASCULAR SURGERY)

## 2017-04-12 PROCEDURE — 93971 EXTREMITY STUDY: CPT | Mod: S$GLB,,, | Performed by: THORACIC SURGERY (CARDIOTHORACIC VASCULAR SURGERY)

## 2017-04-12 NOTE — PROGRESS NOTES
OFFICE VISIT NOTE    Mr. Pendleton underwent EVLT of the left greater saphenous vein a couple of weeks   ago.  He states that there is induration on the medial aspect of his left thigh.    He states that the varicose veins have nearly all completely gone away.    PHYSICAL EXAMINATION:  VITAL SIGNS:  Blood pressure is 150/80, respiratory rate is 18, heart rate is   85, height 5 feet 2 inches, weight 174 pounds.  GENERAL:  He is awake and alert, in no apparent distress.  HEENT:  Head is normocephalic without any masses, atraumatic.  Pupils are equal,   round, reactive.  Sclerae are anicteric.  NECK:  Supple.  Trachea midline.  HEART:  Has a regular rate and rhythm.  ABDOMEN:  Soft and nontender.  EXTREMITIES:  He has induration along the course of the left greater saphenous   vein that was ablated.  His large ropey protruding varicose veins have all gone   away.  He has smaller veins on the medial aspect of the leg.  His   hyperpigmentation is about the same as it was prior to the procedure.  There is   no edema of the ankle and foot.    I did an ultrasound of the left greater saphenous vein and the vein is   noncompressible and has no flow from the proximal leg up to the groin,   indicating successful endovenous laser ablation of the left greater saphenous   vein.    The patient is doing very well.  He states that his leg is feeling much better,   although he still has some pain along the course of the greater saphenous vein.    His large ropey protruding varicose veins are no longer present.  I encouraged   him to continue wearing knee-high compression stockings.  He will return to see   me on a p.r.n. basis.  We thought about doing a possible Veinlite-guided   sclerotherapy, but this patient will not tolerate it.  He will elevate the legs   as much as possible and continue to wear compression stockings.      PORSCHE  dd: 04/12/2017 15:39:27 (CDT)  td: 04/13/2017 14:18:30 (CDT)  Doc ID   #[id number]  Job ID #[id number]    CC:

## 2017-04-12 NOTE — MR AVS SNAPSHOT
[facility]  [address]  [facility] [address]  Phone: [phone]                  Geo Pendleton Jr.   2017 2:30 PM   Office Visit    Description:  Male : 1970   Provider:  Aida Fitch MD   Department:  [facility]           Reason for Visit     Follow-up           Diagnoses this Visit        Comments    Venous insufficiency of left lower extremity    -  Primary            To Do List           Future Appointments        Provider Department Dept Phone    2017 1:00 PM Blanco Chaney MD [facility] [phone]      Goals (5 Years of Data)     None      [facility]     [facility] Nurse Care Line -  Assistance  Unless otherwise directed by your provider, please contact [facility], our nurse care line that is available for  assistance.     Registered nurses in the [facility] provide: appointment scheduling, clinical advisement, health education, and other advisory services.  Call: [phone] (toll free)               Medications           Message regarding Medications     Verify the changes and/or additions to your medication regime listed below are the same as discussed with your clinician today.  If any of these changes or additions are incorrect, please notify your healthcare provider.             Verify that the below list of medications is an accurate representation of the medications you are currently taking.  If none reported, the list may be blank. If incorrect, please contact your healthcare provider. Carry this list with you in case of emergency.           Current Medications     aspirin (ECOTRIN) 81 MG EC tablet Take 1 tablet (81 mg total) by mouth once.    atorvastatin (LIPITOR) 10 MG tablet Take 1 tablet (10 mg total) by mouth once daily.    clopidogrel (PLAVIX) 75 mg tablet Take 1 tablet (75 mg total) by mouth once daily.    hydrocodone-acetaminophen 5-325mg (NORCO) 5-325 mg per tablet Take 1 tablet  "by mouth every 6 (six) hours as needed for Pain.    pantoprazole (PROTONIX) 40 MG tablet Take 1 tablet (40 mg total) by mouth once daily.    sildenafil (VIAGRA) 100 MG tablet Take 1 tablet (100 mg total) by mouth daily as needed for Erectile Dysfunction.    trazodone (DESYREL) 50 MG tablet Take 1 tablet (50 mg total) by mouth every evening.    zolpidem (AMBIEN) 5 MG Tab Take 1 tablet (5 mg total) by mouth nightly as needed.           Clinical Reference Information           Your Vitals Were     BP Pulse Resp Height Weight BMI    124/82 90 16 5' 7" (1.702 m) 63 kg (139 lb) 21.77 kg/m2      Blood Pressure          Most Recent Value    BP  124/82      Allergies as of 4/12/2017     No Known Allergies      Immunizations Administered on Date of Encounter - 4/12/2017     None      Smoking Cessation     If you would like to quit smoking:   You may be eligible for free services if you are a [place] resident and started smoking cigarettes before September 1, 1988.  Call the Smoking Cessation Trust ([facility]) toll free at [phone] or (788) 911-3074.   Call 1-800-QUIT-NOW if you do not meet the above criteria.   Contact us via email: [email or web address]   View our website for more information: [email or web address]        Language Assistance Services     ATTENTION: Language assistance services are available, free of charge. Please call [phone].      ATENCIÓN: Si habla español, tiene a goncalves disposición servicios gratuitos de asistencia lingüística. Llame al [phone].     CHÚ Ý: N?u b?n nói Ti?ng Vi?t, có các d?ch v? h? tr? ngôn ng? mi?n phí dành cho b?n. G?i s? [phone].         [facility] complies with applicable Federal civil rights laws and does not discriminate on the basis of race, color, national origin, age, disability, or sex.        "

## 2017-04-13 DIAGNOSIS — G47.00 INSOMNIA, UNSPECIFIED TYPE: ICD-10-CM

## 2017-04-13 RX ORDER — ZOLPIDEM TARTRATE 5 MG/1
TABLET ORAL
Qty: 30 TABLET | Refills: 0 | OUTPATIENT
Start: 2017-04-13

## 2017-04-13 NOTE — TELEPHONE ENCOUNTER
Reason for Disposition   Caller has NON-URGENT medication question about med that PCP prescribed and triager unable to answer question    Protocols used: ST MEDICATION QUESTION CALL-A-  Pt is calling wanting a refill on his ambien.    Please contact patient with further instructions or questions.

## 2017-04-26 DIAGNOSIS — E78.5 HYPERLIPIDEMIA, UNSPECIFIED HYPERLIPIDEMIA TYPE: ICD-10-CM

## 2017-04-26 DIAGNOSIS — K21.9 GASTROESOPHAGEAL REFLUX DISEASE, ESOPHAGITIS PRESENCE NOT SPECIFIED: ICD-10-CM

## 2017-04-26 DIAGNOSIS — G47.00 INSOMNIA, UNSPECIFIED TYPE: ICD-10-CM

## 2017-04-26 NOTE — TELEPHONE ENCOUNTER
----- Message from Gianna Cueavs sent at 4/26/2017  3:57 PM CDT -----  Contact: self  Patient called stating he has left message regarding new prescription He is out   He needs Ameien 5 mg Atorvastatin 10 mg and Pantoprazole 40 mg called to [facility] on [address]   If any questions please call    Thanks

## 2017-04-27 RX ORDER — PANTOPRAZOLE SODIUM 40 MG/1
40 TABLET, DELAYED RELEASE ORAL DAILY
Qty: 90 TABLET | Refills: 3 | Status: SHIPPED | OUTPATIENT
Start: 2017-04-27 | End: 2018-06-08 | Stop reason: SDUPTHER

## 2017-04-27 RX ORDER — ZOLPIDEM TARTRATE 5 MG/1
5 TABLET ORAL NIGHTLY PRN
Qty: 30 TABLET | Refills: 0 | OUTPATIENT
Start: 2017-04-27 | End: 2017-10-26

## 2017-04-27 RX ORDER — ATORVASTATIN CALCIUM 10 MG/1
10 TABLET, FILM COATED ORAL DAILY
Qty: 90 TABLET | Refills: 3 | Status: SHIPPED | OUTPATIENT
Start: 2017-04-27 | End: 2018-06-08 | Stop reason: SDUPTHER

## 2017-05-01 ENCOUNTER — DOCUMENTATION ONLY (OUTPATIENT)
Dept: FAMILY MEDICINE | Facility: CLINIC | Age: 47
End: 2017-05-01

## 2017-05-01 NOTE — PROGRESS NOTES
Pre-Visit Chart Review  For Appointment Scheduled on 05/02/2017      Health Maintenance Due   Topic Date Due    TETANUS VACCINE  03/27/1990    Pneumococcal PPSV23 (Medium Risk) (1) 03/27/1990

## 2017-05-16 ENCOUNTER — TELEPHONE (OUTPATIENT)
Dept: VASCULAR SURGERY | Facility: CLINIC | Age: 47
End: 2017-05-16

## 2017-05-16 NOTE — TELEPHONE ENCOUNTER
----- Message from Yo Dia sent at 5/16/2017 11:21 AM CDT -----  Contact: Patient  Patient states that he is getting low on his blood thinner and is out of refills.  Can you please look in to this matter and to call in another prescription for the medication, clopidogrel (PLAVIX) 75 mg tablets.  Please call [phone].  Thank you      [id number] - [place] - [address] AT [place] [address]  [address] [place] [address]  Phone: [phone] Fax: [phone]

## 2017-05-16 NOTE — TELEPHONE ENCOUNTER
Advised pt that refill for Plavix 75mg 1 tab daily #90 with 3 refills was called in to [facility] in [place].  Verbalized understanding.

## 2017-06-02 ENCOUNTER — DOCUMENTATION ONLY (OUTPATIENT)
Dept: FAMILY MEDICINE | Facility: CLINIC | Age: 47
End: 2017-06-02

## 2017-06-02 NOTE — PROGRESS NOTES
Pre-Visit Chart Review  For Appointment Scheduled on 06/05/2017      Health Maintenance Due   Topic Date Due    TETANUS VACCINE  03/27/1990    Pneumococcal PPSV23 (Medium Risk) (1) 03/27/1990

## 2017-06-23 ENCOUNTER — TELEPHONE (OUTPATIENT)
Dept: VASCULAR SURGERY | Facility: CLINIC | Age: 47
End: 2017-06-23

## 2017-06-23 DIAGNOSIS — I87.2 VENOUS INSUFFICIENCY: Primary | ICD-10-CM

## 2017-06-23 NOTE — TELEPHONE ENCOUNTER
----- Message from Yoan Stevenson sent at 6/22/2017 11:56 AM CDT -----  Contact: same  Patient called in and stated he had a procedure done by Dr. Butch jamison in March 2017 and is having some complications on his left leg.  Patient stated the pain is from his knee down and its like the leg is heavy.    Patient wanted to see if he could come in to see Dr. Butch jamison.  Patient call back [phone]

## 2017-06-23 NOTE — TELEPHONE ENCOUNTER
Pt c/o pain in legs and states pain in leg increasing in leg that EVLT was preformed.  Scheduled for [facility] in [place] for 6/26/17.  Will call pt with results after reviewed by .  Verbalized understanding appointment confirmed.

## 2017-06-27 ENCOUNTER — TELEPHONE (OUTPATIENT)
Dept: VASCULAR SURGERY | Facility: CLINIC | Age: 47
End: 2017-06-27

## 2017-06-27 DIAGNOSIS — I87.2 VENOUS INSUFFICIENCY: Primary | ICD-10-CM

## 2017-06-27 NOTE — TELEPHONE ENCOUNTER
----- Message from Malena Bates sent at 6/27/2017 10:47 AM CDT -----  Patient needs to reschedule Ultrasound appointments/missed previous appointment/no order in system/please call patient back at [phone] to reschedule with patient.

## 2017-06-30 ENCOUNTER — HOSPITAL ENCOUNTER (OUTPATIENT)
Dept: RADIOLOGY | Facility: CLINIC | Age: 47
Discharge: HOME | End: 2017-06-30
Attending: THORACIC SURGERY (CARDIOTHORACIC VASCULAR SURGERY)
Payer: MEDICARE

## 2017-06-30 DIAGNOSIS — I87.2 VENOUS INSUFFICIENCY: ICD-10-CM

## 2017-06-30 PROCEDURE — 93970 EXTREMITY STUDY: CPT | Mod: TC,PO

## 2017-06-30 PROCEDURE — 93970 EXTREMITY STUDY: CPT | Mod: 26,,, | Performed by: RADIOLOGY

## 2017-07-11 ENCOUNTER — TELEPHONE (OUTPATIENT)
Dept: VASCULAR SURGERY | Facility: CLINIC | Age: 47
End: 2017-07-11

## 2017-07-14 ENCOUNTER — TELEPHONE (OUTPATIENT)
Dept: VASCULAR SURGERY | Facility: CLINIC | Age: 47
End: 2017-07-14

## 2017-07-14 NOTE — TELEPHONE ENCOUNTER
----- Message from Malena Bates sent at 7/14/2017  3:07 PM CDT -----  Patient is calling for Ultrasound test results.Please call patient back at [phone] to advise.  Thanks!

## 2017-07-18 ENCOUNTER — TELEPHONE (OUTPATIENT)
Dept: VASCULAR SURGERY | Facility: CLINIC | Age: 47
End: 2017-07-18

## 2017-07-18 NOTE — TELEPHONE ENCOUNTER
Spoke with pt regarding results of ultrasound and 's recommendations:  Continue with compression stockings, elevation and warm compresses to area of discomfort.  No other intervention needed at this time.  Verbalized understanding.

## 2017-08-22 ENCOUNTER — DOCUMENTATION ONLY (OUTPATIENT)
Dept: FAMILY MEDICINE | Facility: CLINIC | Age: 47
End: 2017-08-22

## 2017-08-22 NOTE — PROGRESS NOTES
Pre-Visit Chart Review  For Appointment Scheduled on 8/23/17.    Health Maintenance Due   Topic Date Due    TETANUS VACCINE  03/27/1990    Pneumococcal PPSV23 (Medium Risk) (1) 03/27/1990    Influenza Vaccine  08/01/2017

## 2017-08-23 ENCOUNTER — OFFICE VISIT (OUTPATIENT)
Dept: FAMILY MEDICINE | Facility: CLINIC | Age: 47
End: 2017-08-23
Payer: MEDICARE

## 2017-08-23 VITALS
TEMPERATURE: 99 F | SYSTOLIC BLOOD PRESSURE: 113 MMHG | WEIGHT: 138.88 LBS | RESPIRATION RATE: 18 BRPM | DIASTOLIC BLOOD PRESSURE: 67 MMHG | BODY MASS INDEX: 21.8 KG/M2 | HEART RATE: 101 BPM | HEIGHT: 67 IN

## 2017-08-23 DIAGNOSIS — E55.9 VITAMIN D DEFICIENCY: ICD-10-CM

## 2017-08-23 DIAGNOSIS — K21.9 GASTROESOPHAGEAL REFLUX DISEASE WITHOUT ESOPHAGITIS: ICD-10-CM

## 2017-08-23 DIAGNOSIS — Z86.73 HISTORY OF CVA (CEREBROVASCULAR ACCIDENT): ICD-10-CM

## 2017-08-23 DIAGNOSIS — J44.9 CHRONIC OBSTRUCTIVE PULMONARY DISEASE, UNSPECIFIED COPD TYPE: ICD-10-CM

## 2017-08-23 DIAGNOSIS — R11.0 NAUSEA: ICD-10-CM

## 2017-08-23 DIAGNOSIS — E78.5 HYPERLIPIDEMIA, UNSPECIFIED HYPERLIPIDEMIA TYPE: ICD-10-CM

## 2017-08-23 DIAGNOSIS — R29.898 LEFT LEG WEAKNESS: Primary | ICD-10-CM

## 2017-08-23 DIAGNOSIS — G47.00 INSOMNIA, UNSPECIFIED TYPE: ICD-10-CM

## 2017-08-23 DIAGNOSIS — M94.9 DISORDER OF CARTILAGE: ICD-10-CM

## 2017-08-23 PROCEDURE — 99214 OFFICE O/P EST MOD 30 MIN: CPT | Mod: S$GLB,,, | Performed by: FAMILY MEDICINE

## 2017-08-23 PROCEDURE — 99499 UNLISTED E&M SERVICE: CPT | Mod: S$GLB,,, | Performed by: FAMILY MEDICINE

## 2017-08-23 PROCEDURE — 3008F BODY MASS INDEX DOCD: CPT | Mod: S$GLB,,, | Performed by: FAMILY MEDICINE

## 2017-08-23 PROCEDURE — 99999 PR PBB SHADOW E&M-EST. PATIENT-LVL III: CPT | Mod: PBBFAC,,, | Performed by: FAMILY MEDICINE

## 2017-08-23 PROCEDURE — S0119 ONDANSETRON 4 MG: HCPCS | Mod: S$GLB,,, | Performed by: FAMILY MEDICINE

## 2017-08-23 RX ORDER — ONDANSETRON 4 MG/1
8 TABLET, ORALLY DISINTEGRATING ORAL
Status: COMPLETED | OUTPATIENT
Start: 2017-08-23 | End: 2017-08-23

## 2017-08-23 RX ORDER — ONDANSETRON 8 MG/1
8 TABLET, ORALLY DISINTEGRATING ORAL EVERY 6 HOURS PRN
Qty: 12 TABLET | Refills: 0 | Status: SHIPPED | OUTPATIENT
Start: 2017-08-23 | End: 2017-09-25 | Stop reason: SDUPTHER

## 2017-08-23 RX ADMIN — ONDANSETRON 8 MG: 4 TABLET, ORALLY DISINTEGRATING ORAL at 01:08

## 2017-08-23 NOTE — PROGRESS NOTES
"[facility]  Progress Note    Subjective:       Patient ID: Geo Pendleton Jr. is a 47 y.o. male.    Chief Complaint: gerd     HPI47 y.o.male is here today complaining of increased acid reflux and nausea.  Patient has had symptoms ongoing for the past 24 hours.  Symptoms have progressed over the past 12 hours.  Patient is currently taking Protonix 40 mg daily.  Patient has not had a resolution in his symptoms.  Patient has not tried additional over-the-counter medication for symptomatically.  Patient is also complaining of left leg weakness.  Patient had extensive varicose veins which were removed.  Since that time patient feels he has lost some strength in his left leg.  Patient will fall occasionally.  Patient has not yet been evaluated by physical therapy for these complaints.  Review of Systems   Constitutional: Negative for chills and fever.   HENT: Negative for congestion.    Eyes: Negative for pain.   Respiratory: Negative for shortness of breath and wheezing.    Cardiovascular: Negative for chest pain, palpitations and leg swelling.   Gastrointestinal: Positive for abdominal pain. Negative for nausea and vomiting.   Genitourinary: Negative for difficulty urinating.   Musculoskeletal: Positive for arthralgias. Negative for gait problem and myalgias.   Skin: Negative for rash.   Neurological: Positive for weakness. Negative for seizures.       Objective:      Vitals:    08/23/17 1243   BP: 113/67   BP Location: Right arm   Patient Position: Sitting   BP Method: Medium (Automatic)   Pulse: 101   Resp: 18   Temp: 98.8 °F (37.1 °C)   TempSrc: Oral   Weight: 63 kg (138 lb 14.2 oz)   Height: 5' 7" (1.702 m)     Body mass index is 21.75 kg/m².  Physical Exam   Constitutional: He is oriented to person, place, and time. He appears well-developed and well-nourished. No distress.   HENT:   Head: Normocephalic and atraumatic.   Cardiovascular: Normal rate, regular rhythm, normal heart sounds and intact distal " pulses.  Exam reveals no gallop and no friction rub.    No murmur heard.  Pulmonary/Chest: Effort normal and breath sounds normal. No respiratory distress. He has no rales.   Abdominal: Soft. He exhibits no distension and no mass. There is no tenderness. There is no rebound and no guarding. No hernia.   Musculoskeletal: Normal range of motion.   Neurological: He is alert and oriented to person, place, and time.   Skin: Skin is warm.   Psychiatric: He has a normal mood and affect. His behavior is normal. Judgment and thought content normal.   Vitals reviewed.      Assessment:       1. Left leg weakness    2. Nausea    3. History of CVA (cerebrovascular accident)    4. Chronic obstructive pulmonary disease, unspecified COPD type    5. Vitamin D deficiency    6. Gastroesophageal reflux disease without esophagitis    7. Hyperlipidemia, unspecified hyperlipidemia type    8. Disorder of cartilage    9. Insomnia, unspecified type        Plan:       Left leg weakness  -     Ambulatory Referral to Physical/Occupational Therapy    Nausea  -     ondansetron disintegrating tablet 8 mg; Take 2 tablets (8 mg total) by mouth one time.  -     ondansetron (ZOFRAN-ODT) 8 MG TbDL; Take 1 tablet (8 mg total) by mouth every 6 (six) hours as needed.  Dispense: 12 tablet; Refill: 0    History of CVA (cerebrovascular accident)        -  Stable continue to monitor     Chronic obstructive pulmonary disease, unspecified COPD type        -  Stable continue to monitor     Vitamin D deficiency  -     Vitamin D; Future; Expected date: 08/23/2017    Gastroesophageal reflux disease without esophagitis  -     CBC auto differential; Future; Expected date: 08/23/2017  -     Comprehensive metabolic panel; Future; Expected date: 08/23/2017    Hyperlipidemia, unspecified hyperlipidemia type  -     Lipid panel; Future; Expected date: 08/23/2017    Disorder of cartilage  -     Vitamin D; Future; Expected date: 08/23/2017    Insomnia, unspecified type  -      TSH; Future; Expected date: 08/23/2017      Return in about 5 months (around 1/23/2018).  Blanco Chaney MD  [facility]  8/23/2017 1:07 PM

## 2017-08-29 ENCOUNTER — TELEPHONE (OUTPATIENT)
Dept: REHABILITATION | Facility: HOSPITAL | Age: 47
End: 2017-08-29

## 2017-09-11 ENCOUNTER — TELEPHONE (OUTPATIENT)
Dept: REHABILITATION | Facility: HOSPITAL | Age: 47
End: 2017-09-11

## 2017-09-25 DIAGNOSIS — R11.0 NAUSEA: ICD-10-CM

## 2017-09-27 RX ORDER — ONDANSETRON 8 MG/1
TABLET, ORALLY DISINTEGRATING ORAL
Qty: 12 TABLET | Refills: 0 | Status: SHIPPED | OUTPATIENT
Start: 2017-09-27 | End: 2017-12-29 | Stop reason: SDUPTHER

## 2017-12-29 DIAGNOSIS — R11.0 NAUSEA: ICD-10-CM

## 2017-12-30 RX ORDER — ONDANSETRON 8 MG/1
TABLET, ORALLY DISINTEGRATING ORAL
Qty: 12 TABLET | Refills: 0 | Status: SHIPPED | OUTPATIENT
Start: 2017-12-30

## 2018-01-22 ENCOUNTER — DOCUMENTATION ONLY (OUTPATIENT)
Dept: FAMILY MEDICINE | Facility: CLINIC | Age: 48
End: 2018-01-22

## 2018-01-22 NOTE — PROGRESS NOTES
Pre-Visit Chart Review  For Appointment Scheduled on  1/23/18.    Health Maintenance Due   Topic Date Due    TETANUS VACCINE  03/27/1990    Pneumococcal PPSV23 (Medium Risk) (1) 03/27/1990    Influenza Vaccine  08/01/2017    Lipid Panel  12/06/2017

## 2018-01-23 ENCOUNTER — TELEPHONE (OUTPATIENT)
Dept: FAMILY MEDICINE | Facility: CLINIC | Age: 48
End: 2018-01-23

## 2018-03-09 ENCOUNTER — PES CALL (OUTPATIENT)
Dept: ADMINISTRATIVE | Facility: CLINIC | Age: 48
End: 2018-03-09

## 2018-06-08 DIAGNOSIS — E78.5 HYPERLIPIDEMIA, UNSPECIFIED HYPERLIPIDEMIA TYPE: ICD-10-CM

## 2018-06-08 DIAGNOSIS — K21.9 GASTROESOPHAGEAL REFLUX DISEASE, ESOPHAGITIS PRESENCE NOT SPECIFIED: ICD-10-CM

## 2018-06-13 RX ORDER — PANTOPRAZOLE SODIUM 40 MG/1
TABLET, DELAYED RELEASE ORAL
Qty: 90 TABLET | Refills: 0 | Status: SHIPPED | OUTPATIENT
Start: 2018-06-13

## 2018-06-13 RX ORDER — ATORVASTATIN CALCIUM 10 MG/1
TABLET, FILM COATED ORAL
Qty: 90 TABLET | Refills: 0 | Status: SHIPPED | OUTPATIENT
Start: 2018-06-13

## 2019-01-28 RX ORDER — CLOPIDOGREL BISULFATE 75 MG/1
TABLET ORAL
Qty: 90 TABLET | Refills: 0 | OUTPATIENT
Start: 2019-01-28

## 2019-08-14 ENCOUNTER — PES CALL (OUTPATIENT)
Dept: ADMINISTRATIVE | Facility: CLINIC | Age: 49
End: 2019-08-14

## 2021-07-01 ENCOUNTER — PATIENT MESSAGE (OUTPATIENT)
Dept: ADMINISTRATIVE | Facility: OTHER | Age: 51
End: 2021-07-01